# Patient Record
Sex: FEMALE | Race: BLACK OR AFRICAN AMERICAN | NOT HISPANIC OR LATINO | Employment: OTHER | URBAN - METROPOLITAN AREA
[De-identification: names, ages, dates, MRNs, and addresses within clinical notes are randomized per-mention and may not be internally consistent; named-entity substitution may affect disease eponyms.]

---

## 2022-11-16 ENCOUNTER — HOSPITAL ENCOUNTER (EMERGENCY)
Facility: CLINIC | Age: 72
Discharge: HOME | End: 2022-11-16
Attending: EMERGENCY MEDICINE | Admitting: EMERGENCY MEDICINE

## 2022-11-16 ENCOUNTER — APPOINTMENT (OUTPATIENT)
Dept: GENERAL RADIOLOGY | Facility: CLINIC | Age: 72
End: 2022-11-16
Attending: EMERGENCY MEDICINE

## 2022-11-16 ENCOUNTER — HOSPITAL ENCOUNTER (EMERGENCY)
Facility: CLINIC | Age: 72
Discharge: LEFT BEFORE DISCHARGE | End: 2022-11-16
Admitting: EMERGENCY MEDICINE

## 2022-11-16 VITALS
OXYGEN SATURATION: 96 % | HEART RATE: 90 BPM | TEMPERATURE: 98.6 F | SYSTOLIC BLOOD PRESSURE: 125 MMHG | DIASTOLIC BLOOD PRESSURE: 84 MMHG | RESPIRATION RATE: 18 BRPM

## 2022-11-16 VITALS
TEMPERATURE: 100.5 F | HEART RATE: 113 BPM | DIASTOLIC BLOOD PRESSURE: 61 MMHG | SYSTOLIC BLOOD PRESSURE: 132 MMHG | OXYGEN SATURATION: 95 % | RESPIRATION RATE: 24 BRPM | WEIGHT: 106.92 LBS

## 2022-11-16 DIAGNOSIS — J45.901 ASTHMA WITH ACUTE EXACERBATION, UNSPECIFIED ASTHMA SEVERITY, UNSPECIFIED WHETHER PERSISTENT: ICD-10-CM

## 2022-11-16 DIAGNOSIS — J10.1 INFLUENZA A: ICD-10-CM

## 2022-11-16 LAB
ANION GAP SERPL CALCULATED.3IONS-SCNC: 10 MMOL/L (ref 7–15)
BASE EXCESS BLDV CALC-SCNC: 3.2 MMOL/L (ref -7.7–1.9)
BASOPHILS # BLD AUTO: 0 10E3/UL (ref 0–0.2)
BASOPHILS NFR BLD AUTO: 0 %
BUN SERPL-MCNC: 11.4 MG/DL (ref 8–23)
CALCIUM SERPL-MCNC: 8.6 MG/DL (ref 8.8–10.2)
CHLORIDE SERPL-SCNC: 99 MMOL/L (ref 98–107)
CREAT SERPL-MCNC: 0.38 MG/DL (ref 0.51–0.95)
DEPRECATED HCO3 PLAS-SCNC: 24 MMOL/L (ref 22–29)
EOSINOPHIL # BLD AUTO: 0 10E3/UL (ref 0–0.7)
EOSINOPHIL NFR BLD AUTO: 0 %
ERYTHROCYTE [DISTWIDTH] IN BLOOD BY AUTOMATED COUNT: 11.9 % (ref 10–15)
GFR SERPL CREATININE-BSD FRML MDRD: >90 ML/MIN/1.73M2
GLUCOSE SERPL-MCNC: 139 MG/DL (ref 70–99)
HCO3 BLDV-SCNC: 28 MMOL/L (ref 21–28)
HCT VFR BLD AUTO: 33.2 % (ref 35–47)
HGB BLD-MCNC: 10.8 G/DL (ref 11.7–15.7)
IMM GRANULOCYTES # BLD: 0.1 10E3/UL
IMM GRANULOCYTES NFR BLD: 1 %
LYMPHOCYTES # BLD AUTO: 1.7 10E3/UL (ref 0.8–5.3)
LYMPHOCYTES NFR BLD AUTO: 11 %
MCH RBC QN AUTO: 29.8 PG (ref 26.5–33)
MCHC RBC AUTO-ENTMCNC: 32.5 G/DL (ref 31.5–36.5)
MCV RBC AUTO: 92 FL (ref 78–100)
MONOCYTES # BLD AUTO: 1.4 10E3/UL (ref 0–1.3)
MONOCYTES NFR BLD AUTO: 9 %
NEUTROPHILS # BLD AUTO: 12.3 10E3/UL (ref 1.6–8.3)
NEUTROPHILS NFR BLD AUTO: 79 %
NRBC # BLD AUTO: 0 10E3/UL
NRBC BLD AUTO-RTO: 0 /100
O2/TOTAL GAS SETTING VFR VENT: 0 %
PCO2 BLDV: 41 MM HG (ref 40–50)
PH BLDV: 7.44 [PH] (ref 7.32–7.43)
PLATELET # BLD AUTO: 209 10E3/UL (ref 150–450)
PO2 BLDV: 36 MM HG (ref 25–47)
POTASSIUM SERPL-SCNC: 4.1 MMOL/L (ref 3.4–5.3)
RBC # BLD AUTO: 3.63 10E6/UL (ref 3.8–5.2)
SODIUM SERPL-SCNC: 133 MMOL/L (ref 136–145)
WBC # BLD AUTO: 15.6 10E3/UL (ref 4–11)

## 2022-11-16 PROCEDURE — 999N000104 HC STATISTIC NO CHARGE

## 2022-11-16 PROCEDURE — 80048 BASIC METABOLIC PNL TOTAL CA: CPT | Performed by: EMERGENCY MEDICINE

## 2022-11-16 PROCEDURE — 71046 X-RAY EXAM CHEST 2 VIEWS: CPT

## 2022-11-16 PROCEDURE — 99284 EMERGENCY DEPT VISIT MOD MDM: CPT

## 2022-11-16 PROCEDURE — 36415 COLL VENOUS BLD VENIPUNCTURE: CPT | Performed by: EMERGENCY MEDICINE

## 2022-11-16 PROCEDURE — 82803 BLOOD GASES ANY COMBINATION: CPT | Performed by: EMERGENCY MEDICINE

## 2022-11-16 PROCEDURE — 85025 COMPLETE CBC W/AUTO DIFF WBC: CPT | Performed by: EMERGENCY MEDICINE

## 2022-11-16 RX ORDER — PREDNISONE 20 MG/1
40 TABLET ORAL DAILY
Qty: 8 TABLET | Refills: 0 | Status: SHIPPED | OUTPATIENT
Start: 2022-11-16 | End: 2022-11-21

## 2022-11-16 RX ORDER — DOXYCYCLINE 100 MG/1
100 CAPSULE ORAL 2 TIMES DAILY
Qty: 14 CAPSULE | Refills: 0 | Status: SHIPPED | OUTPATIENT
Start: 2022-11-16 | End: 2022-11-23

## 2022-11-16 ASSESSMENT — ENCOUNTER SYMPTOMS
FEVER: 1
COUGH: 1
SHORTNESS OF BREATH: 1
APPETITE CHANGE: 1

## 2022-11-16 ASSESSMENT — ACTIVITIES OF DAILY LIVING (ADL)
ADLS_ACUITY_SCORE: 33
ADLS_ACUITY_SCORE: 33

## 2022-11-16 NOTE — ED TRIAGE NOTES
Pt at urgency room last week positive influenza a . Now cough fever getting worse pain in back , very con jested sounding      Triage Assessment     Row Name 11/16/22 0052       Triage Assessment (Adult)    Additional Documentation Breath Sounds (Group)       Breath Sounds    Breath Sounds All Fields    All Lung Fields Breath Sounds coarse;crackles;crackles, coarse       Skin Circulation/Temperature WDL    Skin Circulation/Temperature WDL WDL       Cardiac WDL    Cardiac WDL WDL       Peripheral/Neurovascular WDL    Peripheral Neurovascular WDL WDL       Cognitive/Neuro/Behavioral WDL    Cognitive/Neuro/Behavioral WDL WDL

## 2022-11-16 NOTE — ED PROVIDER NOTES
History   Chief Complaint:  Flu Symptoms       The history is limited by a language barrier.  used: relative.      Megan Boothe is a 72 year old female who presents with flu symptoms. The patient tested positive for the flu 9 days ago. She returned here last night for persisting flu symptoms, but left before being seen by a provider due to the wait. They went to [facility], but were advised to come here. Here in the ED, the patient's grand daughter notes she had a fever yesterday, and that her appetite and water intake have decreased and that her productive cough has worsened over the past couple days. She was prescribed an inhaler when she was first seen 9 days ago.     Review of Systems   Constitutional: Positive for appetite change and fever.   Respiratory: Positive for cough and shortness of breath.    All other systems reviewed and are negative.      Allergies:  No known drug allergies    Medications:  Albuterol    Past Medical History:     Asthma.      Social History:  The patient presents to the ED with grand daughter  PCP: No primary care provider on file.     Physical Exam     Patient Vitals for the past 24 hrs:   BP Temp Temp src Pulse Resp SpO2   11/16/22 1041 125/84 98.6  F (37  C) Temporal 90 18 96 %       Physical Exam  Constitutional: Alert, attentive  HENT:    Nose: Nose normal.    Mouth/Throat: Oropharynx is clear, mucous membranes are moist   Eyes: EOM are normal.   CV: regular rate and rhythm; no murmurs, rubs or gallups  Chest: Effort normal but bilateral end expiratory wheezing  GI:  There is no tenderness. No distension. Normal bowel sounds  MSK: Normal range of motion.   Neurological: Alert, attentive  Skin: Skin is warm and dry.      Emergency Department Course     Emergency Department Course:    Reviewed:  I reviewed nursing notes, vitals and past medical history    Assessments:  1738 I obtained history and examined the patient as noted above.   1743 I rechecked the patient.      Disposition:  The patient was discharged to home.     Impression & Plan     CMS Diagnoses: None    Medical Decision Making:  This is a pleasant 72-year-old female presents for evaluation of cough.  Of note, the patient tested positive for influenza A 9 days ago but has persistent cough.  She is well-hydrated, well-appearing, and has normal vital signs.  Of note, the patient does have bronchospasm on exam and has a history of asthma.  Symptoms are overall most consistent with likely acute asthma exacerbation.  Given prolonged symptoms and advanced age, in addition to prednisone, will also cover with doxycycline for likely underlying bacterial bronchitis.  Believe she is safe for discharge at this time with plan for continued supportive cares, primary care follow-up in 3 to 5 days, return precaution for difficulty breathing or any other concerning symptoms.    Diagnosis:  No diagnosis found.    Discharge Medications:  Discharge Medication List as of 11/16/2022  6:10 PM      START taking these medications    Details   doxycycline hyclate (VIBRAMYCIN) 100 MG capsule Take 1 capsule (100 mg) by mouth 2 times daily for 7 days, Disp-14 capsule, R-0, E-Prescribe      predniSONE (DELTASONE) 20 MG tablet Take 2 tablets (40 mg) by mouth daily for 5 days, Disp-8 tablet, R-0, E-Prescribe             Scribe Disclosure:  I, Marlin Preston, am serving as a scribe at 5:44 PM on 11/16/2022 to document services personally performed by Gavino Barcenas MD based on my observations and the provider's statements to me.            Gavino Barcenas MD  11/16/22 6640

## 2022-11-16 NOTE — ED TRIAGE NOTES
"Patient came in last evening for flu-like symptoms. Tests performed, diagnosed Flu A but did not see a doctor Per family, waiting from midnight to 0800 and left before seeing an MD because wait was too long. Went to [facility] and EMS was called to bring patient to ER because patient is \"too complex\". Vital signs stable per EMS. 98% on room air. Some crackles noted in lower lobes. VSS, ABC.       ** Patient was initially brought in because she was fatigued. First diagnosed with Influenza 1.5 weeks ago. Patient continues to have cough so granddaughter was concerned.  "

## 2024-06-02 ENCOUNTER — HOSPITAL ENCOUNTER (EMERGENCY)
Facility: CLINIC | Age: 74
Discharge: HOME | End: 2024-06-03
Attending: EMERGENCY MEDICINE | Admitting: EMERGENCY MEDICINE

## 2024-06-02 DIAGNOSIS — M25.532 LEFT WRIST PAIN: ICD-10-CM

## 2024-06-02 DIAGNOSIS — M25.552 HIP PAIN, LEFT: ICD-10-CM
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
## 2024-06-02 PROCEDURE — 99284 EMERGENCY DEPT VISIT MOD MDM: CPT

## 2024-06-02 PROCEDURE — 29515 APPLICATION SHORT LEG SPLINT: CPT | Mod: LT

## 2024-06-02 ASSESSMENT — COLUMBIA-SUICIDE SEVERITY RATING SCALE - C-SSRS
2. HAVE YOU ACTUALLY HAD ANY THOUGHTS OF KILLING YOURSELF IN THE PAST MONTH?: NO
1. IN THE PAST MONTH, HAVE YOU WISHED YOU WERE DEAD OR WISHED YOU COULD GO TO SLEEP AND NOT WAKE UP?: NO
6. HAVE YOU EVER DONE ANYTHING, STARTED TO DO ANYTHING, OR PREPARED TO DO ANYTHING TO END YOUR LIFE?: NO

## 2024-06-03 ENCOUNTER — APPOINTMENT (OUTPATIENT)
Dept: GENERAL RADIOLOGY | Facility: CLINIC | Age: 74
End: 2024-06-03
Attending: EMERGENCY MEDICINE

## 2024-06-03 VITALS
SYSTOLIC BLOOD PRESSURE: 160 MMHG | TEMPERATURE: 97.9 F | DIASTOLIC BLOOD PRESSURE: 82 MMHG | RESPIRATION RATE: 20 BRPM | WEIGHT: 119.05 LBS | OXYGEN SATURATION: 100 % | HEART RATE: 60 BPM

## 2024-06-03 PROCEDURE — 73502 X-RAY EXAM HIP UNI 2-3 VIEWS: CPT

## 2024-06-03 PROCEDURE — 73110 X-RAY EXAM OF WRIST: CPT | Mod: LT

## 2024-06-03 RX ORDER — ASPIRIN 81 MG
100 TABLET, DELAYED RELEASE (ENTERIC COATED) ORAL DAILY
Qty: 10 TABLET | Refills: 0 | Status: SHIPPED | OUTPATIENT
Start: 2024-06-03

## 2024-06-03 RX ORDER — OXYCODONE HYDROCHLORIDE 5 MG/1
5 TABLET ORAL ONCE
Status: DISCONTINUED | OUTPATIENT
Start: 2024-06-03 | End: 2024-06-03 | Stop reason: HOSPADM

## 2024-06-03 RX ORDER — OXYCODONE HYDROCHLORIDE 5 MG/1
5 TABLET ORAL EVERY 6 HOURS PRN
Qty: 8 TABLET | Refills: 0 | Status: SHIPPED | OUTPATIENT
Start: 2024-06-03 | End: 2024-06-06

## 2024-06-03 ASSESSMENT — ACTIVITIES OF DAILY LIVING (ADL): ADLS_ACUITY_SCORE: 35

## 2024-06-03 NOTE — ED TRIAGE NOTES
Pt was playing with her grand children and fell hurting her left wrist and hip.  Pt took advil about 20 minutes prior to arrival.  Cms intact     Triage Assessment (Adult)       Row Name 06/02/24 6178          Triage Assessment    Airway WDL WDL        Respiratory WDL    Respiratory WDL WDL        Skin Circulation/Temperature WDL    Skin Circulation/Temperature WDL WDL        Cardiac WDL    Cardiac WDL WDL        Peripheral/Neurovascular WDL    Peripheral Neurovascular WDL WDL        Cognitive/Neuro/Behavioral WDL    Cognitive/Neuro/Behavioral WDL WDL

## 2024-06-03 NOTE — ED PROVIDER NOTES
Emergency Department Note      History of Present Illness     Chief Complaint  Fall    HPI  Interpretation by Granddaughter  Megan Boothe is a right handed 73 year old female who presents with her granddaughter for an evaluation after a fall. The patients granddaughter stated her grandmother experienced a mechanical fall while playing with children around 1730 today and twisted her left wrist and hurt her left hip. She added she took Advil for the pain and her wrist was wrapped in a scarf. She denies hitting her head. She denies using a cane or walker. She denies current hip pain, back pain, paresthesias or other symptoms. She is R. Hand dominant.  She is able to move her fingers and wrist with slight pain and has not taken anything for symptom relief. No anticoagulation.     Independent Historian  Granddaughter as detailed above.; served as     Review of External Notes  None  Past Medical History   Medical History and Problem List  No past medical history     Medications  No current medications   Physical Exam   Patient Vitals for the past 24 hrs:   BP Temp Pulse Resp SpO2 Weight   06/02/24 2325 (!) 175/79 97.9  F (36.6  C) 66 18 97 % 54 kg (119 lb 0.8 oz)     Physical Exam  General: Alert. Appears comfortable  Head:  The scalp, face, and head appear normal without trauma  Eyes:  Sclera white; Pupils are equal and round  ENT:    External ears normal.      External nares normal.    Neck:  No midline tenderness or pain with full ROM.  CV:  Rate as above with regular rhythm   2/2 radial and dorsal pedal pulses  Resp:  Breath sounds clear and equal bilaterally    Non-labored, no retractions or accessory muscle use  GI:  Abdomen soft, non-tender, non-distended    No rebound tenderness or guarding  MSK:  No midline tenderness or bony step-off    L. Wrist with slight soft tissue swelling and + tenderness over snuffbox; able to fully range all fingers; decreased flex/extension wrist 2/2 to pain. O L.  Elbow/shoulder tenderness, full active ROM. Mild L. Hip tenderness, no ecchymosis, full active ROM L. Hip; no L. Knee/ankle tenderness.     Moves all extremities equally and symmetrically other than L. Wrist, decreased ROM 2/2 to pain  Skin:  No rash or lesions noted.  Neuro:   No apparent deficit.    Strength 5/5 in all extremities other than LUE.  Sensation intact x4.     GCS: 15  Psych:  Normal affect.      Diagnostics   Lab Results   Labs Ordered and Resulted from Time of ED Arrival to Time of ED Departure - No data to display    Imaging  XR Wrist Left G/E 3 Views   Final Result   IMPRESSION: Osteopenia. Mild radiocarpal joint space loss. No evidence for fracture. Extensive vascular calcifications.      XR Pelvis w Hip Left 1 View   Final Result   IMPRESSION: No visualized acute fracture, malalignment or other acute osseous abnormality of the left hip.         EKG   None     Independent Interpretation  X-ray L. wrist shows no focal fracture  ED Course    Medications Administered  Medications   oxyCODONE (ROXICODONE) tablet 5 mg (0 mg Oral Hold 6/3/24 0027)     Procedures  None      Discussion of Management  None    Social Determinants of Health adding to complexity of care  None    ED Course  ED Course as of 06/03/24 0120   Mon Jun 03, 2024   0002 I obtained history and examined the patient as noted above.      Medical Decision Making / Diagnosis   CMS Diagnoses: None    MIPS     None    MDM  Megan NALLELY Boothe is a 73 year old female presenting after a mechanical fall with predominantly left wrist pain and left hip pain.  Fortunately no focal fracture or dislocation on x-rays today.  She is otherwise neurovascularly intact.  Given she did have snuffbox tenderness, I will place patient in a Velcro wrist splint.  She declined analgesia provided during her time in the ED.  I will provide however a few oxycodone for breakthrough pain and recommended Tylenol/Motrin, ice to the area and will provide orthopedic follow-up  on discharge should pain persist and additional outpatient imaging be warranted.  I discussed cannot exclude ligamentous injury.  The remainder of patient's trauma exam is unremarkable.  Return precautions given.    Disposition  The patient was discharged.     ICD-10 Codes:    ICD-10-CM    1. Left wrist pain  M25.532       2. Hip pain, left  M25.552       3. Fall, initial encounter  W19.XXXA          Discharge Medications  New Prescriptions    DOCUSATE SODIUM (COLACE) 100 MG TABLET    Take 1 tablet (100 mg) by mouth daily    OXYCODONE (ROXICODONE) 5 MG TABLET    Take 1 tablet (5 mg) by mouth every 6 hours as needed     Scribe Disclosure:  ISandhya, am serving as a scribe at 12:13 AM on 6/3/2024 to document services personally performed by Sonal Carcamo DO based on my observations and the provider's statements to me.      Sonal Carcamo DO  06/03/24 7715